# Patient Record
Sex: MALE | Race: WHITE | Employment: FULL TIME | URBAN - METROPOLITAN AREA
[De-identification: names, ages, dates, MRNs, and addresses within clinical notes are randomized per-mention and may not be internally consistent; named-entity substitution may affect disease eponyms.]

---

## 2017-10-23 ENCOUNTER — OFFICE VISIT (OUTPATIENT)
Dept: INTERNAL MEDICINE CLINIC | Age: 32
End: 2017-10-23

## 2017-10-23 VITALS
TEMPERATURE: 98 F | HEIGHT: 69 IN | SYSTOLIC BLOOD PRESSURE: 137 MMHG | BODY MASS INDEX: 26.84 KG/M2 | HEART RATE: 64 BPM | OXYGEN SATURATION: 99 % | WEIGHT: 181.2 LBS | DIASTOLIC BLOOD PRESSURE: 86 MMHG | RESPIRATION RATE: 16 BRPM

## 2017-10-23 DIAGNOSIS — Z11.3 SCREEN FOR STD (SEXUALLY TRANSMITTED DISEASE): ICD-10-CM

## 2017-10-23 DIAGNOSIS — Z23 ENCOUNTER FOR IMMUNIZATION: ICD-10-CM

## 2017-10-23 DIAGNOSIS — N50.812 TESTICULAR PAIN, LEFT: Primary | ICD-10-CM

## 2017-10-23 NOTE — PROGRESS NOTES
HISTORY OF PRESENT ILLNESS  Melinda Self is a 36 y.o. male. HPI  Presents for acute care    Left testicular pain x several days  Dull ache  Most prominent when at rest and still. No trauma  No urinary symptoms    Pt wearing briefs to provide additional support without any improvement    +unprotected sex - but, no known STD exposure and same partner x 1 year. Past medical, Social, and Family history reviewed  Medications reviewed and updated. ROS  Complete ROS reviewed and negative or stable except as noted in HPI. Physical Exam   Constitutional: He is oriented to person, place, and time. He appears well-nourished. No distress. HENT:   Head: Normocephalic and atraumatic. Eyes: EOM are normal. Pupils are equal, round, and reactive to light. No scleral icterus. Neck: Normal range of motion. Neck supple. Cardiovascular: Normal rate. Pulmonary/Chest: Effort normal. No respiratory distress. Abdominal: Soft. He exhibits no distension. There is no tenderness. Genitourinary: Penis normal.   Genitourinary Comments: No hernia, mass, nodule or tenderness to either testicle   Musculoskeletal: Normal range of motion. He exhibits no edema. Neurological: He is alert and oriented to person, place, and time. He exhibits normal muscle tone. Skin: Skin is warm. No rash noted. Psychiatric: He has a normal mood and affect. Nursing note and vitals reviewed. Prior labs reviewed. ASSESSMENT and PLAN    ICD-10-CM ICD-9-CM    1. Testicular pain, left N50.812 608.9 CBC WITH AUTOMATED DIFF      METABOLIC PANEL, COMPREHENSIVE      CT/NG/T.VAGINALIS AMPLIFICATION      CULTURE, URINE      URINALYSIS W/ RFLX MICROSCOPIC      US SCROTUM/TESTICLES   2. Screen for STD (sexually transmitted disease) Z11.3 V74.5 HIV 1/2 AG/AB, 4TH GENERATION,W RFLX CONFIRM      HCV AB W/RFLX TO ANN MARIE      HEP B SURFACE AG      T PALLIDUM AB      CT/NG/T.VAGINALIS AMPLIFICATION   3.  Encounter for immunization Z23 V03.89 INFLUENZA VIRUS VAC QUAD,SPLIT,PRESV FREE SYRINGE IM      SD IMMUNIZ ADMIN,1 SINGLE/COMB VAC/TOXOID     Follow-up Disposition:  Return in about 2 months (around 12/23/2017), or if symptoms worsen or fail to improve, for wellness visit.    results and schedule of future studies reviewed with patient  reviewed diet, exercise and weight     reviewed medications and side effects in detail   Scrotal US  Urine and lab testing  Given the absence of tenderness, deferred abx today

## 2017-10-23 NOTE — MR AVS SNAPSHOT
Visit Information Date & Time Provider Department Dept. Phone Encounter #  
 10/23/2017  3:45 PM Bobbi Villar, [address] [place] [address] and Internal Medicine [phone] [id number] Follow-up Instructions Return in about 2 months (around 12/23/2017), or if symptoms worsen or fail to improve, for wellness visit. Upcoming Health Maintenance Date Due INFLUENZA AGE 9 TO ADULT 8/1/2017 DTaP/Tdap/Td series (2 - Td) 10/7/2023 Allergies as of 10/23/2017  Review Complete On: 10/23/2017 By: Bobbi Villar MD  
  
 Severity Noted Reaction Type Reactions Pcn [Penicillins]  10/31/2011    Other (comments) \"stopped breathing\" childhood reaction Sulfa (Sulfonamide Antibiotics)  10/31/2011    Other (comments) \"stopped breathing\" childhood reation Current Immunizations  Reviewed on 9/28/2016 Name Date HPV (Quad) 12/10/2014, 7/8/2014, 10/7/2013  3:57 PM  
 Influenza Vaccine (Quad) PF 9/28/2016, 12/10/2014, 10/7/2013  3:56 PM  
 Influenza Vaccine Split 10/22/2012 Tdap 10/7/2013  3:56 PM  
  
 Not reviewed this visit You Were Diagnosed With   
  
 Codes Comments Testicular pain, left    -  Primary ICD-10-CM: L18.346 ICD-9-CM: 608.9 Screen for STD (sexually transmitted disease)     ICD-10-CM: Z11.3 ICD-9-CM: V74.5 Vitals BP Pulse Temp Resp Height(growth percentile) Weight(growth percentile) 137/86 (BP 1 Location: Right arm, BP Patient Position: Sitting) 64 98 °F (36.7 °C) (Oral) 16 5' 9\" (1.753 m) 181 lb 3.2 oz (82.2 kg) SpO2 BMI Smoking Status 99% 26.76 kg/m2 Never Smoker BMI and BSA Data Body Mass Index Body Surface Area  
 26.76 kg/m 2 2 m 2 Preferred Pharmacy Pharmacy Name Phone [id number] [place] [facility], [address] Replaced by [facility] 3 [phone] Your Updated Medication List  
  
   
This list is accurate as of: 10/23/17  4:38 PM.  Always use your most recent med list.  
  
  
  
  
 cetirizine 10 mg tablet Commonly known as:  ZYRTEC Take 1 Tab by mouth daily. ciclopirox-nail lacq-ft deod 4 8 % Kit 1 Applicator by Apply Externally route daily. * hydrOXYzine HCl 25 mg tablet Commonly known as:  ATARAX  
1-2 PO every 6hr PRN itching * hydrOXYzine HCl 25 mg tablet Commonly known as:  ATARAX Take 1 Tab by mouth every eight (8) hours as needed for Itching. ibuprofen 600 mg tablet Commonly known as:  MOTRIN Take 1 Tab by mouth two (2) times daily (with meals). For 7-10 days as directed  
  
 loratadine 10 mg tablet Commonly known as:  Tashi Seal Take 10 mg by mouth daily. predniSONE 10 mg tablet Commonly known as:  Yoly Chill Taper. 60mg x1, 50mg x 1, 40mg x 1, 30mg x 1, 20mg x 1, 10mg x 1  
  
 terbinafine HCl 1 % topical cream  
Commonly known as:  LAMISIL Apply  to affected area two (2) times a day. * triamcinolone acetonide 0.1 % topical cream  
Commonly known as:  KENALOG Apply  to affected area two (2) times a day. use thin layer * triamcinolone acetonide 0.1 % topical cream  
Commonly known as:  KENALOG Apply  to affected area two (2) times a day. use thin layer * Notice: This list has 4 medication(s) that are the same as other medications prescribed for you. Read the directions carefully, and ask your doctor or other care provider to review them with you. We Performed the Following CBC WITH AUTOMATED DIFF [64817 CPT(R)] CT/NG/T.VAGINALIS AMPLIFICATION [id number] CPT(R)] CULTURE, URINE [id number] CPT(R)] HCV AB W/RFLX TO ANN MARIE [98805 CPT(R)] HEP B SURFACE AG [id number] CPT(R)] HIV 1/2 AG/AB, 4TH GENERATION,W RFLX CONFIRM [[id number] Custom] METABOLIC PANEL, COMPREHENSIVE [19536 CPT(R)] T PALLIDUM AB [[id number] Custom] URINALYSIS W/ RFLX MICROSCOPIC [16527 CPT(R)] Follow-up Instructions Return in about 2 months (around 12/23/2017), or if symptoms worsen or fail to improve, for wellness visit. To-Do List   
 10/25/2017 Imaging:  US SCROTUM/TESTICLES Introducing [facility]! Dear Clive Fritz: Thank you for requesting a [employer] account. Our records indicate that you already have an active [employer] account. You can access your account anytime at [email or web address] Did you know that you can access your hospital and ER discharge instructions at any time in [employer]? You can also review all of your test results from your hospital stay or ER visit. Additional Information If you have questions, please visit the Frequently Asked Questions section of the [employer] website at [email or web address] Remember, [employer] is NOT to be used for urgent needs. For medical emergencies, dial 911. Now available from your iPhone and Android! Please provide this summary of care documentation to your next provider. Your primary care clinician is listed as [address] If you have any questions after today's visit, please call [phone].

## 2017-10-23 NOTE — PROGRESS NOTES
Rm 13    Chief Complaint   Patient presents with    Testicle Pain     left side only, noticed a week ago, no swelling, no injury per pt     Pt would like STD and HIV testing today if possible  1. Have you been to the ER, urgent care clinic since your last visit? Hospitalized since your last visit? No    2. Have you seen or consulted any other health care providers outside of the [address] [place] [address] since your last visit? Include any pap smears or colon screening.  No    Health Maintenance Due   Topic Date Due    INFLUENZA AGE 9 TO ADULT  08/01/2017     PHQ over the last two weeks 10/23/2017   Little interest or pleasure in doing things Not at all   Feeling down, depressed or hopeless Not at all   Total Score PHQ 2 0

## 2017-10-25 LAB
APPEARANCE UR: ABNORMAL
BACTERIA UR CULT: NO GROWTH
BILIRUB UR QL STRIP: NEGATIVE
C TRACH RRNA SPEC QL NAA+PROBE: NEGATIVE
COLOR UR: YELLOW
GLUCOSE UR QL: NEGATIVE
HGB UR QL STRIP: NEGATIVE
KETONES UR QL STRIP: NEGATIVE
LEUKOCYTE ESTERASE UR QL STRIP: NEGATIVE
MICRO URNS: ABNORMAL
N GONORRHOEA RRNA SPEC QL NAA+PROBE: NEGATIVE
NITRITE UR QL STRIP: NEGATIVE
PH UR STRIP: 6 [PH] (ref 5–7.5)
PROT UR QL STRIP: NEGATIVE
SP GR UR: 1.01 (ref 1–1.03)
T VAGINALIS RRNA SPEC QL NAA+PROBE: NEGATIVE
UROBILINOGEN UR STRIP-MCNC: 0.2 MG/DL (ref 0.2–1)

## 2017-11-17 ENCOUNTER — HOSPITAL ENCOUNTER (OUTPATIENT)
Dept: ULTRASOUND IMAGING | Age: 32
Discharge: HOME | End: 2017-11-17
Attending: INTERNAL MEDICINE
Payer: COMMERCIAL

## 2017-11-17 DIAGNOSIS — N50.812 TESTICULAR PAIN, LEFT: ICD-10-CM

## 2017-11-17 PROCEDURE — 76870 US EXAM SCROTUM: CPT

## 2018-09-07 ENCOUNTER — OFFICE VISIT (OUTPATIENT)
Dept: INTERNAL MEDICINE CLINIC | Age: 33
End: 2018-09-07

## 2018-09-07 VITALS
OXYGEN SATURATION: 98 % | SYSTOLIC BLOOD PRESSURE: 129 MMHG | DIASTOLIC BLOOD PRESSURE: 75 MMHG | TEMPERATURE: 98.3 F | WEIGHT: 188 LBS | HEIGHT: 69 IN | RESPIRATION RATE: 16 BRPM | HEART RATE: 64 BPM | BODY MASS INDEX: 27.85 KG/M2

## 2018-09-07 DIAGNOSIS — A09 TRAVELER'S DIARRHEA: ICD-10-CM

## 2018-09-07 DIAGNOSIS — Z00.00 WELL ADULT EXAM: Primary | ICD-10-CM

## 2018-09-07 DIAGNOSIS — Z71.84 COUNSELING FOR TRAVEL: ICD-10-CM

## 2018-09-07 DIAGNOSIS — Z23 ENCOUNTER FOR IMMUNIZATION: ICD-10-CM

## 2018-09-07 DIAGNOSIS — J45.909 REACTIVE AIRWAY DISEASE WITHOUT COMPLICATION, UNSPECIFIED ASTHMA SEVERITY, UNSPECIFIED WHETHER PERSISTENT: ICD-10-CM

## 2018-09-07 DIAGNOSIS — J30.2 SEASONAL ALLERGIC RHINITIS, UNSPECIFIED TRIGGER: ICD-10-CM

## 2018-09-07 RX ORDER — ATOVAQUONE AND PROGUANIL HYDROCHLORIDE 250; 100 MG/1; MG/1
1 TABLET, FILM COATED ORAL DAILY
Qty: 14 TAB | Refills: 0 | Status: SHIPPED | OUTPATIENT
Start: 2018-09-07

## 2018-09-07 RX ORDER — AZITHROMYCIN 250 MG/1
500 TABLET, FILM COATED ORAL DAILY
Qty: 6 TAB | Refills: 0 | Status: SHIPPED | OUTPATIENT
Start: 2018-09-07 | End: 2018-09-10

## 2018-09-07 NOTE — PROGRESS NOTES
FOREIGN TRAVEL CLINIC ASSESSMENT                Date of Departure: 10/9/18         Date of return/duration of travel:  5 days         Countries to be visited, or transited, in exact chronological order:  [address] [place]    Travel is primarily:  [place] only   n  Visiting Friends & Relatives (VFR). n  [place]      Past Medical History:  Reviewed        Travel History:  Prior travel to other countries to which vaccines recommended/required? NA    List of Countries:  NA    Vaccination History:  Are routine childhood immunizations completed and up to date? Immunization History   Administered Date(s) Administered    HPV (Quad) 10/07/2013, 07/08/2014, 12/10/2014    Influenza Vaccine (Quad) PF 10/07/2013, 12/10/2014, 09/28/2016, 10/23/2017    Influenza Vaccine Split 10/22/2012    Tdap 10/07/2013        Did you grow up in the [facility]? yes    Prior travel vaccines received:  None     Polio  Primary series completed  Yes ;  Last dose received  ? Vaccine indications, contraindications, risks, benefits, schedules, routes discussed with patient and questions, comments discussed. RECOMMENDED Vaccines, Not received:  NA          PRIOR ANTI-MALARIALS: no    PRIOR ANTI-DIARRHEAL THERAPY:  no      OTHER PRIOR TRAVEL MEDS:  no       PHYSICAL EXAM    General appearance - alert, well appearing, and in no distress  EYE-RONNIE, EOMI  Chest - clear to auscultation, no wheezes, rales or rhonchi, symmetric air entry   Heart - normal rate, regular rhythm, normal S1, S2, no murmurs, rubs, clicks or gallops        ASSESSMENT and PLAN    ICD-10-CM ICD-9-CM    1. Counseling for travel Z71.89 V65.49 atovaquone-proguanil (MALARONE) 250-100 mg per tablet   2. Traveler's diarrhea A09 009.2 azithromycin (ZITHROMAX) 250 mg tablet   3.  Encounter for immunization Z23 V03.89 HEPATITIS A VACCINE, ADULT DOSAGE, IM      TYPHOID VACCINE, VI CAPSULAR POLYSACCHARIDE (VICPS), IM      INFLUENZA VIRUS VAC QUAD,SPLIT,PRESV FREE SYRINGE IM   4. Well adult exam Z00.00 V70.0 CBC WITH AUTOMATED DIFF      LIPID PANEL      METABOLIC PANEL, COMPREHENSIVE   5. Reactive airway disease without complication, unspecified asthma severity, unspecified whether persistent J45.909 493.90    6. Seasonal allergic rhinitis, unspecified trigger J30.2 477.9      Follow-up Disposition:  Return in about 1 year (around 9/7/2019), or if symptoms worsen or fail to improve, for wellness visit.    results and schedule of future studies reviewed with patient  reviewed diet, exercise and weight  reviewed medications and side effects in detail     azithro  malarone  Typhoid IM  Hep A #1

## 2018-09-07 NOTE — MR AVS SNAPSHOT
[address] [place] [address] [address] [address] 
[phone] Patient: Bertin Price MRN: [id number] Visit Information Date & Time Provider Department Dept. Phone Encounter #  
 2018  2:30 PM Alannah Leija, [address] [place] [address] and Internal Medicine [phone] [id number] Follow-up Instructions Return in about 1 year (around 2019), or if symptoms worsen or fail to improve, for wellness visit. Upcoming Health Maintenance Date Due Influenza Age 5 to Adult 2018 DTaP/Tdap/Td series (2 - Td) 10/7/2023 Allergies as of 2018  Review Complete On: 2018 By: Alannah Leija MD  
  
 Severity Noted Reaction Type Reactions Pcn [Penicillins]  10/31/2011    Other (comments) \"stopped breathing\" childhood reaction Sulfa (Sulfonamide Antibiotics)  10/31/2011    Other (comments) \"stopped breathing\" childhood reation Current Immunizations  Reviewed on 2018 Name Date HPV (Quad) 12/10/2014, 2014, 10/7/2013  3:57 PM  
 Hep A Vaccine (Adult)  Incomplete Influenza Vaccine (Quad) PF  Incomplete, 10/23/2017, 2016, 12/10/2014, 10/7/2013  3:56 PM  
 Influenza Vaccine Split 10/22/2012 Tdap 10/7/2013  3:56 PM  
 Typhoid Vi Capsular Polysaccharide Vaccine  Incomplete Reviewed by Alannah Leija MD on 2018 at  3:04 PM  
 Reviewed by Alannah Leija MD on 2018 at  3:04 PM  
You Were Diagnosed With   
  
 Codes Comments Well adult exam    -  Primary ICD-10-CM: Z00.00 ICD-9-CM: V70.0 Counseling for travel     ICD-10-CM: Z71.89 ICD-9-CM: V65.49 Traveler's diarrhea     ICD-10-CM: A09 ICD-9-CM: 009.2 Encounter for immunization     ICD-10-CM: N45 ICD-9-CM: V03.89 Vitals BP Pulse Temp Resp Height(growth percentile) Weight(growth percentile)  129/75 (BP 1 Location: Left arm, BP Patient Position: Sitting) 64 98.3 °F (36.8 °C) (Oral) 16 5' 9\" (1.753 m) 188 lb (85.3 kg) SpO2 BMI Smoking Status 98% 27.76 kg/m2 Never Smoker Vitals History BMI and BSA Data Body Mass Index Body Surface Area  
 27.76 kg/m 2 2.04 m 2 Preferred Pharmacy [facility]acy Name Phone [id number] [address] [facility] [phone] Your Updated Medication List  
  
   
This list is accurate as of 9/7/18  3:31 PM.  Always use your most recent med list.  
  
  
  
  
 atovaquone-proguanil 250-100 mg per tablet Commonly known as:  MALARONE Take 1 Tab by mouth daily. Start one day prior to travel and continue daily through seven days after return. azithromycin 250 mg tablet Commonly known as:  Delorse Sabot Take 2 Tabs by mouth daily for 3 days. cetirizine 10 mg tablet Commonly known as:  ZYRTEC Take 1 Tab by mouth daily. ciclopirox-nail lacq-ft deod 4 8 % Kit 1 Applicator by Apply Externally route daily. * hydrOXYzine HCl 25 mg tablet Commonly known as:  ATARAX  
1-2 PO every 6hr PRN itching * hydrOXYzine HCl 25 mg tablet Commonly known as:  ATARAX Take 1 Tab by mouth every eight (8) hours as needed for Itching. ibuprofen 600 mg tablet Commonly known as:  MOTRIN Take 1 Tab by mouth two (2) times daily (with meals). For 7-10 days as directed  
  
 loratadine 10 mg tablet Commonly known as:  Leatha Ritter Take 10 mg by mouth daily. predniSONE 10 mg tablet Commonly known as:  Chula Greek Taper. 60mg x1, 50mg x 1, 40mg x 1, 30mg x 1, 20mg x 1, 10mg x 1  
  
 terbinafine HCl 1 % topical cream  
Commonly known as:  LAMISIL Apply  to affected area two (2) times a day. * triamcinolone acetonide 0.1 % topical cream  
Commonly known as:  KENALOG Apply  to affected area two (2) times a day. use thin layer * triamcinolone acetonide 0.1 % topical cream  
Commonly known as:  KENALOG Apply  to affected area two (2) times a day. use thin layer * Notice: This list has 4 medication(s) that are the same as other medications prescribed for you. Read the directions carefully, and ask your doctor or other care provider to review them with you. Prescriptions Sent to Pharmacy Refills  
 atovaquone-proguanil (MALARONE) 250-100 mg per tablet 0 Sig: Take 1 Tab by mouth daily. Start one day prior to travel and continue daily through seven days after return. Class: Normal  
 Pharmacy: [address] [place] [address] #: [phone] Route: Oral  
 azithromycin (ZITHROMAX) 250 mg tablet 0 Sig: Take 2 Tabs by mouth daily for 3 days. Class: Normal  
 Pharmacy: [address] [place] [address] Ph #: [phone] Route: Oral  
  
We Performed the Following HEPATITIS A VACCINE, ADULT DOSAGE, IM [60338 CPT(R)] INFLUENZA VIRUS VAC QUAD,SPLIT,PRESV FREE SYRINGE IM [id number] CPT(R)] TYPHOID VACCINE, VI CAPSULAR POLYSACCHARIDE (VICPS), IM [id number] CPT(R)] Follow-up Instructions Return in about 1 year (around 9/7/2019), or if symptoms worsen or fail to improve, for wellness visit. To-Do List   
 Around 09/13/2018 Lab:  CBC WITH AUTOMATED DIFF Around 09/13/2018 Lab:  LIPID PANEL Around 09/13/2018 Lab:  METABOLIC PANEL, COMPREHENSIVE [facility]! Dear Jason Ellington: Thank you for requesting a [employer] account. Our records indicate that you already have an active [employer] account. You can access your account anytime at [email or web address] Did you know that you can access your hospital and ER discharge instructions at any time in [employer]? You can also review all of your test results from your hospital stay or ER visit. Additional Information If you have questions, please visit the Frequently Asked Questions section of the [employer] website at [email or web address] Remember, [employer] is NOT to be used for urgent needs. For medical emergencies, dial 911. Now available from your iPhone and Android! Please provide this summary of care documentation to your next provider. Your primary care clinician is listed as [address] If you have any questions after today's visit, please call [phone].

## 2018-09-07 NOTE — PROGRESS NOTES
Rm#15  Chief Complaint   Patient presents with    Well Male     29 y.o.    Immunization/Injection     travel visit [address] [facility]        1. Have you been to the ER, urgent care clinic since your last visit? Hospitalized since your last visit? No    2. Have you seen or consulted any other health care providers outside of the [address] [place] [address] since your last visit? Include any pap smears or colon screening.  No  Health Maintenance Due   Topic Date Due    Influenza Age 5 to Adult  08/01/2018   pt would like flu vacc   PHQ over the last two weeks 9/7/2018   Little interest or pleasure in doing things Not at all   Feeling down, depressed, irritable, or hopeless Not at all   Total Score PHQ 2 0

## 2018-09-16 NOTE — PROGRESS NOTES
Subjective:   Scott Brown is a 29 y.o. male presenting for his annual checkup. ROS:  Feeling well. No dyspnea or chest pain on exertion. No abdominal pain, change in bowel habits, black or bloody stools. No urinary tract or prostatic symptoms. No neurological complaints. Specific concerns today:   See travel note. Doing well. No further testicular pain. resp status stable. Past medical, Social, and Family history reviewed  Medications reviewed and updated. Objective:     Visit Vitals    /75 (BP 1 Location: Left arm, BP Patient Position: Sitting)    Pulse 64    Temp 98.3 °F (36.8 °C) (Oral)    Resp 16    Ht 5' 9\" (1.753 m)    Wt 188 lb (85.3 kg)    SpO2 98%    BMI 27.76 kg/m2     The patient appears well, alert, oriented x 3, in no distress. ENT normal.  Neck supple. No adenopathy or thyromegaly. RONNIE. Lungs are clear, good air entry, no wheezes, rhonchi or rales. S1 and S2 normal, no murmurs, regular rate and rhythm. Abdomen is soft without tenderness, guarding, mass or organomegaly.  exam: deferred. Extremities show no edema, normal peripheral pulses. Neurological is normal without focal findings. Prior labs reviewed. Assessment/Plan:   healthy adult male  follow low fat diet, routine labs ordered, call if any problems. ICD-10-CM ICD-9-CM    1. Well adult exam Z00.00 V70.0 CBC WITH AUTOMATED DIFF      LIPID PANEL      METABOLIC PANEL, COMPREHENSIVE   2. Counseling for travel Z71.89 V65.49 atovaquone-proguanil (MALARONE) 250-100 mg per tablet   3. Traveler's diarrhea A09 009.2 azithromycin (ZITHROMAX) 250 mg tablet   4. Encounter for immunization Z23 V03.89 HEPATITIS A VACCINE, ADULT DOSAGE, IM      TYPHOID VACCINE, VI CAPSULAR POLYSACCHARIDE (VICPS), IM      INFLUENZA VIRUS VAC QUAD,SPLIT,PRESV FREE SYRINGE IM   5. Reactive airway disease without complication, unspecified asthma severity, unspecified whether persistent J45.909 493.90    6.  Seasonal allergic rhinitis, unspecified trigger J30.2 477.9      Follow-up Disposition:  Return in about 1 year (around 9/7/2019), or if symptoms worsen or fail to improve, for wellness visit. results and schedule of future studies reviewed with patient  reviewed diet, exercise and weight   cardiovascular risk and specific lipid/LDL goals reviewed  reviewed medications and side effects in detail.   Return for fasting labs

## 2019-03-04 ENCOUNTER — OFFICE VISIT (OUTPATIENT)
Dept: INTERNAL MEDICINE CLINIC | Age: 34
End: 2019-03-04

## 2019-03-04 VITALS
SYSTOLIC BLOOD PRESSURE: 131 MMHG | WEIGHT: 189.4 LBS | DIASTOLIC BLOOD PRESSURE: 82 MMHG | HEART RATE: 63 BPM | TEMPERATURE: 97.5 F | RESPIRATION RATE: 17 BRPM | OXYGEN SATURATION: 100 % | BODY MASS INDEX: 28.05 KG/M2 | HEIGHT: 69 IN

## 2019-03-04 DIAGNOSIS — R03.0 ELEVATED BLOOD PRESSURE READING: ICD-10-CM

## 2019-03-04 DIAGNOSIS — G89.29 GROIN PAIN, CHRONIC, LEFT: Primary | ICD-10-CM

## 2019-03-04 DIAGNOSIS — R10.32 GROIN PAIN, CHRONIC, LEFT: Primary | ICD-10-CM

## 2019-03-04 DIAGNOSIS — J30.2 SEASONAL ALLERGIC RHINITIS, UNSPECIFIED TRIGGER: ICD-10-CM

## 2019-03-04 DIAGNOSIS — S76.212D GROIN STRAIN, LEFT, SUBSEQUENT ENCOUNTER: ICD-10-CM

## 2019-03-04 RX ORDER — IBUPROFEN 600 MG/1
600 TABLET ORAL 2 TIMES DAILY WITH MEALS
Qty: 30 TAB | Refills: 1 | Status: SHIPPED | OUTPATIENT
Start: 2019-03-04

## 2019-03-04 NOTE — PROGRESS NOTES
[facility] 34 [facility]    Chief Complaint   Patient presents with    Groin Pain     per patient groin pain x 1 year     1. Have you been to the ER, urgent care clinic since your last visit? Hospitalized since your last visit? No    2. Have you seen or consulted any other health care providers outside of the [address] [place] [address] since your last visit? Include any pap smears or colon screening. No     There are no preventive care reminders to display for this patient.

## 2019-03-04 NOTE — PROGRESS NOTES
HPI:  Presents for f/u groin pain    Worse following increased activity  Soreness in the latter part of the day    Feels pressure with bladder fullness. Supportive underwear seems to help some. No bulging. Patient runs a few miles per week  No symptoms during running. Past medical, Social, and Family history reviewed    Prior to Admission medications    Medication Sig Start Date End Date Taking? Authorizing Provider   ibuprofen (MOTRIN) 600 mg tablet Take 1 Tab by mouth two (2) times daily (with meals). X 1 weeks as directed. 3/4/19  Yes Rene Tapia MD   atovaquone-proguanil (MALARONE) 250-100 mg per tablet Take 1 Tab by mouth daily. Start one day prior to travel and continue daily through seven days after return. Patient not taking: Reported on 3/4/2019 9/7/18   Rene Tapia MD   ibuprofen (MOTRIN) 600 mg tablet Take 1 Tab by mouth two (2) times daily (with meals). For 7-10 days as directed  Patient not taking: Reported on 3/4/2019 8/3/15   Rene Tapia MD   triamcinolone acetonide (KENALOG) 0.1 % topical cream Apply  to affected area two (2) times a day. use thin layer  Patient not taking: Reported on 3/4/2019 8/3/15   Rene Tapia MD   terbinafine (LAMISIL) 1 % topical cream Apply  to affected area two (2) times a day. Patient not taking: Reported on 3/4/2019 7/8/14   Rene Tapia MD   ciclopirox-nail lacq-foot no.4 8 % kit 1 Applicator by Apply Externally route daily. Patient not taking: Reported on 3/4/2019 7/8/14   Rene Tapia MD   loratadine (CLARITIN) 10 mg tablet Take 10 mg by mouth daily. Provider, Julieth   hydrOXYzine (ATARAX) 25 mg tablet Take 1 Tab by mouth every eight (8) hours as needed for Itching. Patient not taking: Reported on 3/4/2019 12/6/13   Rene Tapia MD   predniSONE (DELTASONE) 10 mg tablet Taper.   60mg x1, 50mg x 1, 40mg x 1, 30mg x 1, 20mg x 1, 10mg x 1  Patient not taking: Reported on 3/4/2019 12/6/13   Mika Wayneubs MD DELVIS   triamcinolone acetonide (KENALOG) 0.1 % topical cream Apply  to affected area two (2) times a day. use thin layer  Patient not taking: Reported on 3/4/2019 8/13/13   Kadi Aldridge MD   hydrOXYzine (ATARAX) 25 mg tablet 1-2 PO every 6hr PRN itching  Patient not taking: Reported on 3/4/2019 8/12/13   John Mayer MD   cetirizine (ZYRTEC) 10 mg tablet Take 1 Tab by mouth daily. Patient not taking: Reported on 3/4/2019 8/12/13   John Mayer MD          ROS  Complete ROS reviewed and negative or stable except as noted in HPI. Physical Exam   Constitutional: He is oriented to person, place, and time. He appears well-nourished. No distress. HENT:   Head: Normocephalic and atraumatic. Eyes: EOM are normal. Pupils are equal, round, and reactive to light. No scleral icterus. Neck: Normal range of motion. Neck supple. Cardiovascular: Normal rate. Pulmonary/Chest: Effort normal. No respiratory distress. Abdominal: Soft. He exhibits no distension. There is no tenderness. Genitourinary: Penis normal.   Genitourinary Comments: No prostate tenderness, localizes pain to pubic ligaments. No hernia palpable. Musculoskeletal: Normal range of motion. He exhibits no edema. Neurological: He is alert and oriented to person, place, and time. He exhibits normal muscle tone. Skin: Skin is warm. No rash noted. Psychiatric: He has a normal mood and affect. Nursing note and vitals reviewed. Prior labs reviewed. Assessment/Plan:  Groin pain - ?etiology    ICD-10-CM ICD-9-CM    1. Groin pain, chronic, left R10.32 789.09 REFERRAL TO ORTHOPEDICS    G89.29 338.29 REFERRAL TO PHYSICAL THERAPY   2. Groin strain, left, subsequent encounter S76.212D V58.89 REFERRAL TO ORTHOPEDICS      REFERRAL TO PHYSICAL THERAPY      ibuprofen (MOTRIN) 600 mg tablet   3. Seasonal allergic rhinitis, unspecified trigger J30.2 477.9    4.  Elevated blood pressure reading R03.0 796.2      Follow-up Disposition:  Return in about 6 months (around 9/4/2019), or if symptoms worsen or fail to improve, for wellness visit.   results and schedule of future studies reviewed with patient  reviewed diet, exercise and weight    reviewed medications and side effects in detail   Ref PT  Ref to ortho   Schedule NSAIDs  Consider urologist eval if persists or ortho disagrees

## 2019-03-18 ENCOUNTER — HOSPITAL ENCOUNTER (OUTPATIENT)
Dept: PHYSICAL THERAPY | Age: 34
End: 2019-03-18

## 2023-05-20 RX ORDER — CETIRIZINE HYDROCHLORIDE 10 MG/1
10 TABLET ORAL DAILY
COMMUNITY
Start: 2013-08-12

## 2023-05-20 RX ORDER — LORATADINE 10 MG/1
10 TABLET ORAL DAILY
COMMUNITY

## 2023-05-20 RX ORDER — ATOVAQUONE AND PROGUANIL HYDROCHLORIDE 250; 100 MG/1; MG/1
1 TABLET, FILM COATED ORAL DAILY
COMMUNITY
Start: 2018-09-07

## 2023-05-20 RX ORDER — IBUPROFEN 600 MG/1
600 TABLET ORAL 2 TIMES DAILY WITH MEALS
COMMUNITY
Start: 2015-08-03

## 2023-05-20 RX ORDER — HYDROXYZINE HYDROCHLORIDE 25 MG/1
25 TABLET, FILM COATED ORAL EVERY 8 HOURS PRN
COMMUNITY
Start: 2013-08-12

## 2023-05-20 RX ORDER — PREDNISONE 10 MG/1
TABLET ORAL
COMMUNITY
Start: 2013-12-06

## 2023-05-20 RX ORDER — TRIAMCINOLONE ACETONIDE 1 MG/G
CREAM TOPICAL 2 TIMES DAILY
COMMUNITY
Start: 2013-08-13